# Patient Record
Sex: MALE | Race: WHITE | Employment: UNEMPLOYED | URBAN - METROPOLITAN AREA
[De-identification: names, ages, dates, MRNs, and addresses within clinical notes are randomized per-mention and may not be internally consistent; named-entity substitution may affect disease eponyms.]

---

## 2021-07-15 ENCOUNTER — HOSPITAL ENCOUNTER (EMERGENCY)
Age: 51
Discharge: HOME | End: 2021-07-15
Attending: EMERGENCY MEDICINE

## 2021-07-15 ENCOUNTER — APPOINTMENT (OUTPATIENT)
Dept: CT IMAGING | Age: 51
End: 2021-07-15

## 2021-07-15 VITALS
WEIGHT: 220 LBS | DIASTOLIC BLOOD PRESSURE: 92 MMHG | TEMPERATURE: 98 F | RESPIRATION RATE: 18 BRPM | OXYGEN SATURATION: 98 % | SYSTOLIC BLOOD PRESSURE: 196 MMHG | HEART RATE: 71 BPM | BODY MASS INDEX: 30.8 KG/M2 | HEIGHT: 71 IN

## 2021-07-15 DIAGNOSIS — N20.1 URETERIC STONE: Primary | ICD-10-CM

## 2021-07-15 LAB
-: NORMAL
ABSOLUTE EOS #: 0.1 K/UL (ref 0–0.4)
ABSOLUTE IMMATURE GRANULOCYTE: ABNORMAL K/UL (ref 0–0.3)
ABSOLUTE LYMPH #: 1.6 K/UL (ref 1–4.8)
ABSOLUTE MONO #: 0.6 K/UL (ref 0.1–1.3)
AMORPHOUS: NORMAL
ANION GAP SERPL CALCULATED.3IONS-SCNC: 11 MMOL/L (ref 9–17)
BACTERIA: NORMAL
BASOPHILS # BLD: 0 % (ref 0–2)
BASOPHILS ABSOLUTE: 0 K/UL (ref 0–0.2)
BILIRUBIN URINE: NEGATIVE
BUN BLDV-MCNC: 10 MG/DL (ref 6–20)
BUN/CREAT BLD: ABNORMAL (ref 9–20)
CALCIUM SERPL-MCNC: 9.9 MG/DL (ref 8.6–10.4)
CASTS UA: NORMAL /LPF
CHLORIDE BLD-SCNC: 106 MMOL/L (ref 98–107)
CO2: 23 MMOL/L (ref 20–31)
COLOR: YELLOW
COMMENT UA: ABNORMAL
CREAT SERPL-MCNC: 0.7 MG/DL (ref 0.7–1.2)
CRYSTALS, UA: NORMAL /HPF
DIFFERENTIAL TYPE: ABNORMAL
EOSINOPHILS RELATIVE PERCENT: 1 % (ref 0–4)
EPITHELIAL CELLS UA: NORMAL /HPF
GFR AFRICAN AMERICAN: >60 ML/MIN
GFR NON-AFRICAN AMERICAN: >60 ML/MIN
GFR SERPL CREATININE-BSD FRML MDRD: ABNORMAL ML/MIN/{1.73_M2}
GFR SERPL CREATININE-BSD FRML MDRD: ABNORMAL ML/MIN/{1.73_M2}
GLUCOSE BLD-MCNC: 113 MG/DL (ref 70–99)
GLUCOSE URINE: NEGATIVE
HCT VFR BLD CALC: 48.2 % (ref 41–53)
HEMOGLOBIN: 16.2 G/DL (ref 13.5–17.5)
IMMATURE GRANULOCYTES: ABNORMAL %
KETONES, URINE: NEGATIVE
LEUKOCYTE ESTERASE, URINE: NEGATIVE
LYMPHOCYTES # BLD: 17 % (ref 24–44)
MCH RBC QN AUTO: 31.9 PG (ref 26–34)
MCHC RBC AUTO-ENTMCNC: 33.7 G/DL (ref 31–37)
MCV RBC AUTO: 94.8 FL (ref 80–100)
MONOCYTES # BLD: 6 % (ref 1–7)
MUCUS: NORMAL
NITRITE, URINE: NEGATIVE
NRBC AUTOMATED: ABNORMAL PER 100 WBC
OTHER OBSERVATIONS UA: NORMAL
PDW BLD-RTO: 13.2 % (ref 11.5–14.9)
PH UA: 6.5 (ref 5–8)
PLATELET # BLD: 246 K/UL (ref 150–450)
PLATELET ESTIMATE: ABNORMAL
PMV BLD AUTO: 8.7 FL (ref 6–12)
POTASSIUM SERPL-SCNC: 4.5 MMOL/L (ref 3.7–5.3)
PROTEIN UA: NEGATIVE
RBC # BLD: 5.08 M/UL (ref 4.5–5.9)
RBC # BLD: ABNORMAL 10*6/UL
RBC UA: NORMAL /HPF
RENAL EPITHELIAL, UA: NORMAL /HPF
SEG NEUTROPHILS: 76 % (ref 36–66)
SEGMENTED NEUTROPHILS ABSOLUTE COUNT: 7.6 K/UL (ref 1.3–9.1)
SODIUM BLD-SCNC: 140 MMOL/L (ref 135–144)
SPECIFIC GRAVITY UA: 1.01 (ref 1–1.03)
TRICHOMONAS: NORMAL
TURBIDITY: CLEAR
URINE HGB: ABNORMAL
UROBILINOGEN, URINE: NORMAL
WBC # BLD: 9.9 K/UL (ref 3.5–11)
WBC # BLD: ABNORMAL 10*3/UL
WBC UA: NORMAL /HPF
YEAST: NORMAL

## 2021-07-15 PROCEDURE — 2580000003 HC RX 258: Performed by: EMERGENCY MEDICINE

## 2021-07-15 PROCEDURE — 74176 CT ABD & PELVIS W/O CONTRAST: CPT

## 2021-07-15 PROCEDURE — 85025 COMPLETE CBC W/AUTO DIFF WBC: CPT

## 2021-07-15 PROCEDURE — 96375 TX/PRO/DX INJ NEW DRUG ADDON: CPT

## 2021-07-15 PROCEDURE — 81001 URINALYSIS AUTO W/SCOPE: CPT

## 2021-07-15 PROCEDURE — 80048 BASIC METABOLIC PNL TOTAL CA: CPT

## 2021-07-15 PROCEDURE — 96374 THER/PROPH/DIAG INJ IV PUSH: CPT

## 2021-07-15 PROCEDURE — 6360000002 HC RX W HCPCS: Performed by: EMERGENCY MEDICINE

## 2021-07-15 PROCEDURE — 99284 EMERGENCY DEPT VISIT MOD MDM: CPT

## 2021-07-15 PROCEDURE — 36415 COLL VENOUS BLD VENIPUNCTURE: CPT

## 2021-07-15 RX ORDER — IBUPROFEN 800 MG/1
800 TABLET ORAL EVERY 8 HOURS PRN
Qty: 30 TABLET | Refills: 0 | Status: SHIPPED | OUTPATIENT
Start: 2021-07-15

## 2021-07-15 RX ORDER — ONDANSETRON 2 MG/ML
4 INJECTION INTRAMUSCULAR; INTRAVENOUS ONCE
Status: COMPLETED | OUTPATIENT
Start: 2021-07-15 | End: 2021-07-15

## 2021-07-15 RX ORDER — 0.9 % SODIUM CHLORIDE 0.9 %
1000 INTRAVENOUS SOLUTION INTRAVENOUS ONCE
Status: COMPLETED | OUTPATIENT
Start: 2021-07-15 | End: 2021-07-15

## 2021-07-15 RX ORDER — KETOROLAC TROMETHAMINE 30 MG/ML
30 INJECTION, SOLUTION INTRAMUSCULAR; INTRAVENOUS ONCE
Status: COMPLETED | OUTPATIENT
Start: 2021-07-15 | End: 2021-07-15

## 2021-07-15 RX ORDER — TAMSULOSIN HYDROCHLORIDE 0.4 MG/1
0.4 CAPSULE ORAL DAILY
Qty: 5 CAPSULE | Refills: 0 | Status: SHIPPED | OUTPATIENT
Start: 2021-07-15 | End: 2021-07-20

## 2021-07-15 RX ADMIN — SODIUM CHLORIDE 1000 ML: 9 INJECTION, SOLUTION INTRAVENOUS at 15:11

## 2021-07-15 RX ADMIN — ONDANSETRON 4 MG: 2 INJECTION, SOLUTION INTRAMUSCULAR; INTRAVENOUS at 15:12

## 2021-07-15 RX ADMIN — KETOROLAC TROMETHAMINE 30 MG: 30 INJECTION, SOLUTION INTRAMUSCULAR; INTRAVENOUS at 15:12

## 2021-07-15 ASSESSMENT — ENCOUNTER SYMPTOMS
DIARRHEA: 0
TROUBLE SWALLOWING: 0
CONSTIPATION: 0
SORE THROAT: 0
ABDOMINAL PAIN: 0
BLOOD IN STOOL: 0
SHORTNESS OF BREATH: 0
COUGH: 0
COLOR CHANGE: 0
VOMITING: 0
NAUSEA: 0
BACK PAIN: 0

## 2021-07-15 ASSESSMENT — PAIN SCALES - GENERAL: PAINLEVEL_OUTOF10: 5

## 2021-07-15 NOTE — ED PROVIDER NOTES
[address]  EMERGENCY DEPARTMENT ENCOUNTER    Pt Name: Kortney Rangel  MRN: [id number]  YOB: 1970  Date of evaluation:7/15/21  PCP: No primary care provider on file. CHIEF COMPLAINT       Chief Complaint   Patient presents with    Flank Pain     right       HISTORY OF PRESENT ILLNESS    Kortney Rangel is a 46 y.o. male who presents with a chief complaint of right flank pain. He states that his right flank has been hurting him for the past 3 days. Is been intermittent until today. Its been constant all day today. Pain is sharp and does not radiate. No pain in his back. No pain in his abdomen. No pain in his testicles. No nausea or vomiting. No changes in bowel or bladder habits. No dark urine that he has noticed. No fevers or chills. States he has a history of multiple kidney stones in the past and they felt similar to this. Describes his pain is sharp and 5 out of 10 in severity. Symptoms are acute. Nothing make symptoms better or worse. Patient has no other complaints at this time. REVIEW OF SYSTEMS       Review of Systems   Constitutional: Negative for chills, fatigue and fever. HENT: Negative for congestion, ear pain, sore throat and trouble swallowing. Eyes: Negative for visual disturbance. Respiratory: Negative for cough and shortness of breath. Cardiovascular: Negative for chest pain, palpitations and leg swelling. Gastrointestinal: Negative for abdominal pain, blood in stool, constipation, diarrhea, nausea and vomiting. Genitourinary: Positive for flank pain. Negative for dysuria, hematuria and testicular pain. Musculoskeletal: Negative for arthralgias, back pain, myalgias and neck pain. Skin: Negative for color change, rash and wound. Neurological: Negative for dizziness, weakness, light-headedness, numbness and headaches. Psychiatric/Behavioral: Negative for confusion. All other systems reviewed and are negative.     Negative in 10 essential Systems except as mentioned above and in the HPI. PAST MEDICAL HISTORY   History reviewed. No pertinent past medical history. Kidney stones     SURGICAL HISTORY      has no past surgical history on file. None    CURRENT MEDICATIONS       Previous Medications    No medications on file       ALLERGIES     is allergic to pcn [penicillins]. FAMILY HISTORY     has no family status information on file. family history is not on file. SOCIAL HISTORY      reports that he has been smoking cigarettes. He has been smoking about 1.00 pack per day. He has never used smokeless tobacco.    PHYSICAL EXAM     INITIAL VITALS:  height is 5' 11\" (1.803 m) and weight is 220 lb (99.8 kg). His oral temperature is 98 °F (36.7 °C). His blood pressure is 196/92 (abnormal) and his pulse is 71. His respiration is 18 and oxygen saturation is 98%. Physical Exam  Vitals and nursing note reviewed. Constitutional:       General: He is not in acute distress. HENT:      Head: Normocephalic and atraumatic. Eyes:      Conjunctiva/sclera: Conjunctivae normal.      Pupils: Pupils are equal, round, and reactive to light. Cardiovascular:      Rate and Rhythm: Normal rate and regular rhythm. Heart sounds: Normal heart sounds. No murmur heard. Pulmonary:      Effort: Pulmonary effort is normal. No respiratory distress. Breath sounds: Normal breath sounds. Abdominal:      General: Bowel sounds are normal. There is no distension. Palpations: Abdomen is soft. Tenderness: There is no abdominal tenderness. There is no right CVA tenderness or left CVA tenderness. Musculoskeletal:         General: No tenderness. Cervical back: Neck supple. Lymphadenopathy:      Cervical: No cervical adenopathy. Skin:     General: Skin is warm and dry. Findings: No rash. Neurological:      Mental Status: He is alert and oriented to person, place, and time.    Psychiatric:         Judgment: Judgment normal. DIFFERENTIAL DIAGNOSIS/MDM:   19-year-old male presents with right flank pain for the past 3 days. He is afebrile, nontoxic, hypertensive but otherwise vital signs normal.  No acute distress. I cannot elicit any tenderness on abdominal exam.  I cannot elicit any tenderness on CVA exam.    Pain is more in his right side. Differential includes kidney stone, UTI, less likely appendicitis. We will get CT abdomen pelvis, blood work, urinalysis, treat symptoms. DIAGNOSTIC RESULTS     EKG: All EKG's are interpreted by the Emergency Department Physician who either signs or Co-signs this chart in the absence of a cardiologist.        RADIOLOGY:   I directly visualized the following  images and reviewed the radiologist interpretations:  CT ABDOMEN PELVIS WO CONTRAST Additional Contrast? None   Final Result   1. Mild right hydroureteronephrosis resulting from 3 mm proximal right ureter   calculus. 2. Remainder of the CT abdomen and pelvis appears unremarkable. RECOMMENDATIONS:                    ED BEDSIDE ULTRASOUND:      LABS:  Labs Reviewed   CBC WITH AUTO DIFFERENTIAL - Abnormal; Notable for the following components:       Result Value    Seg Neutrophils 76 (*)     Lymphocytes 17 (*)     All other components within normal limits   BASIC METABOLIC PANEL - Abnormal; Notable for the following components:    Glucose 113 (*)     All other components within normal limits   URINE RT REFLEX TO CULTURE - Abnormal; Notable for the following components:    Urine Hgb MOD (*)     All other components within normal limits   MICROSCOPIC URINALYSIS         EMERGENCY DEPARTMENT COURSE:   Vitals:    Vitals:    07/15/21 1324   BP: (!) 196/92   Pulse: 71   Resp: 18   Temp: 98 °F (36.7 °C)   TempSrc: Oral   SpO2: 98%   Weight: 220 lb (99.8 kg)   Height: 5' 11\" (1.803 m)     3:12 PM EDT  CT scan shows 3 mm minimally obstructing stone. He appears very comfortable right now. Urinalysis, blood work pending.       3:55 PM EDT  Work-up is unremarkable here. Kidney function is normal.  Urinalysis shows no evidence of infection. Based on the size of the stone I think he most likely will pass it at home. Will discharge home with NSAIDs, Flomax. He is advised to follow-up with his PCP, return if any symptoms worsen. He is agreeable with plan will be discharged home today. CRITICALCARE:      CONSULTS:  None      PROCEDURES:      FINAL IMPRESSION      1.  Ureteric stone            DISPOSITION/PLAN   DISPOSITION      Discharged home      PATIENT REFERRED TO:  [facility]  [address] [facility]  [phone]    As needed, If symptoms worsen      DISCHARGE MEDICATIONS:  New Prescriptions    IBUPROFEN (ADVIL;MOTRIN) 800 MG TABLET    Take 1 tablet by mouth every 8 hours as needed for Pain    TAMSULOSIN (FLOMAX) 0.4 MG CAPSULE    Take 1 capsule by mouth daily for 5 doses       (Please note that portions ofthis note were completed with a voice recognition program.  Efforts were made to edit the dictations but occasionally words are mis-transcribed.)    Kishore Long DO  Attending Emergency Physician          Kishore Long DO  07/15/21 0528